# Patient Record
Sex: MALE | Race: WHITE | NOT HISPANIC OR LATINO | Employment: OTHER | URBAN - METROPOLITAN AREA
[De-identification: names, ages, dates, MRNs, and addresses within clinical notes are randomized per-mention and may not be internally consistent; named-entity substitution may affect disease eponyms.]

---

## 2017-04-24 ENCOUNTER — ALLIED HEALTH/NURSE VISIT (OUTPATIENT)
Dept: NURSING | Facility: CLINIC | Age: 27
End: 2017-04-24
Payer: COMMERCIAL

## 2017-04-24 DIAGNOSIS — Z23 NEED FOR TDAP VACCINATION: Primary | ICD-10-CM

## 2017-04-24 PROCEDURE — 99207 ZZC NO CHARGE NURSE ONLY: CPT

## 2017-04-24 PROCEDURE — 90715 TDAP VACCINE 7 YRS/> IM: CPT

## 2017-04-24 PROCEDURE — 90471 IMMUNIZATION ADMIN: CPT

## 2023-11-19 PROBLEM — Z11.59 NEED FOR HEPATITIS C SCREENING TEST: Status: ACTIVE | Noted: 2023-11-19

## 2024-03-19 ENCOUNTER — HOSPITAL ENCOUNTER (EMERGENCY)
Facility: CLINIC | Age: 34
Discharge: HOME | End: 2024-03-19
Attending: PHYSICIAN ASSISTANT | Admitting: PHYSICIAN ASSISTANT

## 2024-03-19 VITALS
BODY MASS INDEX: 24.65 KG/M2 | HEIGHT: 70 IN | WEIGHT: 172.18 LBS | TEMPERATURE: 100.4 F | HEART RATE: 77 BPM | DIASTOLIC BLOOD PRESSURE: 70 MMHG | RESPIRATION RATE: 18 BRPM | SYSTOLIC BLOOD PRESSURE: 116 MMHG | OXYGEN SATURATION: 97 %

## 2024-03-19 DIAGNOSIS — J10.1 INFLUENZA B: ICD-10-CM

## 2024-03-19 LAB
ALBUMIN SERPL BCG-MCNC: 4.3 G/DL (ref 3.5–5.2)
ALBUMIN UR-MCNC: 30 MG/DL
ALP SERPL-CCNC: 63 U/L (ref 40–150)
ALT SERPL W P-5'-P-CCNC: 22 U/L (ref 0–70)
ANION GAP SERPL CALCULATED.3IONS-SCNC: 15 MMOL/L (ref 7–15)
APPEARANCE UR: CLEAR
AST SERPL W P-5'-P-CCNC: 26 U/L (ref 0–45)
ATRIAL RATE - MUSE: 89 BPM
BASOPHILS # BLD AUTO: 0 10E3/UL (ref 0–0.2)
BASOPHILS NFR BLD AUTO: 0 %
BILIRUB SERPL-MCNC: 1.1 MG/DL
BILIRUB UR QL STRIP: NEGATIVE
BUN SERPL-MCNC: 16 MG/DL (ref 6–20)
CALCIUM SERPL-MCNC: 9.1 MG/DL (ref 8.6–10)
CHLORIDE SERPL-SCNC: 98 MMOL/L (ref 98–107)
COLOR UR AUTO: YELLOW
CREAT SERPL-MCNC: 1.14 MG/DL (ref 0.67–1.17)
DEPRECATED HCO3 PLAS-SCNC: 26 MMOL/L (ref 22–29)
DIASTOLIC BLOOD PRESSURE - MUSE: NORMAL MMHG
EGFRCR SERPLBLD CKD-EPI 2021: 86 ML/MIN/1.73M2
EOSINOPHIL # BLD AUTO: 0 10E3/UL (ref 0–0.7)
EOSINOPHIL NFR BLD AUTO: 0 %
ERYTHROCYTE [DISTWIDTH] IN BLOOD BY AUTOMATED COUNT: 12.8 % (ref 10–15)
FLUAV RNA SPEC QL NAA+PROBE: NEGATIVE
FLUBV RNA RESP QL NAA+PROBE: POSITIVE
GLUCOSE SERPL-MCNC: 156 MG/DL (ref 70–99)
GLUCOSE UR STRIP-MCNC: NEGATIVE MG/DL
HCT VFR BLD AUTO: 44.6 % (ref 40–53)
HGB BLD-MCNC: 14.8 G/DL (ref 13.3–17.7)
HGB UR QL STRIP: NEGATIVE
IMM GRANULOCYTES # BLD: 0 10E3/UL
IMM GRANULOCYTES NFR BLD: 0 %
INTERPRETATION ECG - MUSE: NORMAL
KETONES UR STRIP-MCNC: NEGATIVE MG/DL
LEUKOCYTE ESTERASE UR QL STRIP: NEGATIVE
LYMPHOCYTES # BLD AUTO: 1.2 10E3/UL (ref 0.8–5.3)
LYMPHOCYTES NFR BLD AUTO: 22 %
MCH RBC QN AUTO: 29.3 PG (ref 26.5–33)
MCHC RBC AUTO-ENTMCNC: 33.2 G/DL (ref 31.5–36.5)
MCV RBC AUTO: 88 FL (ref 78–100)
MONOCYTES # BLD AUTO: 0.6 10E3/UL (ref 0–1.3)
MONOCYTES NFR BLD AUTO: 11 %
MUCOUS THREADS #/AREA URNS LPF: PRESENT /LPF
NEUTROPHILS # BLD AUTO: 3.6 10E3/UL (ref 1.6–8.3)
NEUTROPHILS NFR BLD AUTO: 67 %
NITRATE UR QL: NEGATIVE
NRBC # BLD AUTO: 0 10E3/UL
NRBC BLD AUTO-RTO: 0 /100
P AXIS - MUSE: 66 DEGREES
PH UR STRIP: 6 [PH] (ref 5–7)
PLATELET # BLD AUTO: 127 10E3/UL (ref 150–450)
POTASSIUM SERPL-SCNC: 4.4 MMOL/L (ref 3.4–5.3)
PR INTERVAL - MUSE: 140 MS
PROT SERPL-MCNC: 7.3 G/DL (ref 6.4–8.3)
QRS DURATION - MUSE: 92 MS
QT - MUSE: 330 MS
QTC - MUSE: 401 MS
R AXIS - MUSE: 72 DEGREES
RBC # BLD AUTO: 5.05 10E6/UL (ref 4.4–5.9)
RBC URINE: 2 /HPF
RSV RNA SPEC NAA+PROBE: NEGATIVE
SARS-COV-2 RNA RESP QL NAA+PROBE: NEGATIVE
SODIUM SERPL-SCNC: 139 MMOL/L (ref 135–145)
SP GR UR STRIP: 1.03 (ref 1–1.03)
SYSTOLIC BLOOD PRESSURE - MUSE: NORMAL MMHG
T AXIS - MUSE: 36 DEGREES
TROPONIN T SERPL HS-MCNC: 7 NG/L
UROBILINOGEN UR STRIP-MCNC: NORMAL MG/DL
VENTRICULAR RATE- MUSE: 89 BPM
WBC # BLD AUTO: 5.5 10E3/UL (ref 4–11)
WBC URINE: 1 /HPF

## 2024-03-19 PROCEDURE — 80053 COMPREHEN METABOLIC PANEL: CPT | Performed by: PHYSICIAN ASSISTANT

## 2024-03-19 PROCEDURE — 93005 ELECTROCARDIOGRAM TRACING: CPT

## 2024-03-19 PROCEDURE — 250N000013 HC RX MED GY IP 250 OP 250 PS 637: Performed by: PHYSICIAN ASSISTANT

## 2024-03-19 PROCEDURE — 99284 EMERGENCY DEPT VISIT MOD MDM: CPT

## 2024-03-19 PROCEDURE — 84484 ASSAY OF TROPONIN QUANT: CPT | Performed by: PHYSICIAN ASSISTANT

## 2024-03-19 PROCEDURE — 81001 URINALYSIS AUTO W/SCOPE: CPT | Performed by: PHYSICIAN ASSISTANT

## 2024-03-19 PROCEDURE — 85004 AUTOMATED DIFF WBC COUNT: CPT | Performed by: PHYSICIAN ASSISTANT

## 2024-03-19 PROCEDURE — 36415 COLL VENOUS BLD VENIPUNCTURE: CPT | Performed by: PHYSICIAN ASSISTANT

## 2024-03-19 PROCEDURE — 87637 SARSCOV2&INF A&B&RSV AMP PRB: CPT | Performed by: PHYSICIAN ASSISTANT

## 2024-03-19 RX ORDER — ACETAMINOPHEN 325 MG/1
975 TABLET ORAL ONCE
Status: COMPLETED | OUTPATIENT
Start: 2024-03-19 | End: 2024-03-19

## 2024-03-19 RX ADMIN — ACETAMINOPHEN 975 MG: 325 TABLET, FILM COATED ORAL at 11:27

## 2024-03-19 ASSESSMENT — COLUMBIA-SUICIDE SEVERITY RATING SCALE - C-SSRS
1. IN THE PAST MONTH, HAVE YOU WISHED YOU WERE DEAD OR WISHED YOU COULD GO TO SLEEP AND NOT WAKE UP?: NO
6. HAVE YOU EVER DONE ANYTHING, STARTED TO DO ANYTHING, OR PREPARED TO DO ANYTHING TO END YOUR LIFE?: NO
2. HAVE YOU ACTUALLY HAD ANY THOUGHTS OF KILLING YOURSELF IN THE PAST MONTH?: NO

## 2024-03-19 ASSESSMENT — ACTIVITIES OF DAILY LIVING (ADL)
ADLS_ACUITY_SCORE: 35

## 2024-03-19 NOTE — ED PROVIDER NOTES
"  History     Chief Complaint:  Dizziness and Fatigue       HPI   Lenin Hassan is a 33 year old male who presents for evaluation of dizziness, fatigue, fever, cough, and rhinorrhea this morning. He has had a Tmax of 102.9 taken at 0300 this morning. He has been around multiple sick contacts. He describes the dizziness as a \"tunnel-vision\" sensation, feeling the need to lay down due to feeling near-syncope. He denies a room-spinning sensation. He also has a decreased appetite and diarrhea. He notes similar symptoms 2 weeks ago which resolved on their own. He denies chest pain, shortness of breath, leg swelling, abdominal pain, ear pain, sore throat, dysuria, nausea, vomiting, headaches, vision changes, or blood in stool. Of note, he mentions baseline urinary frequency, but attributes this to heavy coffee consumption. He denies alcohol use.    Independent Historian:   Mother report she was sick with RSV recently, but she has recovered from her symptoms.    Review of External Notes:   None       Medications:  The patient is currently on no regular medications.    Past Medical History:     The patient denies past medical history.      Past Surgical History:    None    Physical Exam   Patient Vitals for the past 24 hrs:   BP Temp Temp src Pulse Resp SpO2 Height Weight   03/19/24 1330 116/70 -- -- 77 -- 97 % -- --   03/19/24 1319 116/75 -- -- -- -- 96 % -- --   03/19/24 1304 125/84 -- -- -- -- 95 % -- --   03/19/24 1249 114/63 -- -- -- -- 96 % -- --   03/19/24 1234 122/61 -- -- -- -- 96 % -- --   03/19/24 1219 124/63 -- -- -- -- 96 % -- --   03/19/24 1159 126/65 -- -- 83 -- 95 % -- --   03/19/24 1145 113/75 -- -- 85 -- 94 % -- --   03/19/24 1050 134/76 100.4  F (38  C) Oral 100 18 99 % 1.778 m (5' 10\") 78.1 kg (172 lb 2.9 oz)        Physical Exam    Constitutional: Pleasant. Cooperative.   Eyes: Pupils equally round and reactive  HENT: Head is normal in appearance. Oropharynx is normal with moist mucus " membranes.  Cardiovascular: Regular rate and rhythm and without murmurs.  Respiratory: Normal respiratory effort, lungs are clear bilaterally.  GI: Abdomen is soft, non-tender, non-distended. No guarding, rebound, or rigidity.  Musculoskeletal: No asymmetry of the lower extremities.  Skin: Normal, without rash.  Neurologic: Cranial nerves grossly intact, normal cognition, no focal deficits. Alert and oriented x 3.   Psychiatric: Normal affect.  Nursing notes and vital signs reviewed.       Emergency Department Course   ECG  ECG results from 03/19/24   EKG 12-lead, tracing only     Value    Systolic Blood Pressure     Diastolic Blood Pressure     Ventricular Rate 89    Atrial Rate 89    KS Interval 140    QRS Duration 92        QTc 401    P Axis 66    R AXIS 72    T Axis 36    Interpretation ECG      Sinus rhythm  Normal ECG  No previous ECGs available  Unconfirmed report - interpretation of this ECG is computer generated - see medical record for final interpretation  Confirmed by - EMERGENCY ROOM, PHYSICIAN ([id number]),  RIDDHI JONES ([id number]) on 3/19/2024 12:13:15 PM       Laboratory:  Labs Ordered and Resulted from Time of ED Arrival to Time of ED Departure   INFLUENZA A/B, RSV, & SARS-COV2 PCR - Abnormal       Result Value    Influenza A PCR Negative      Influenza B PCR Positive (*)     RSV PCR Negative      SARS CoV2 PCR Negative     COMPREHENSIVE METABOLIC PANEL - Abnormal    Sodium 139      Potassium 4.4      Carbon Dioxide (CO2) 26      Anion Gap 15      Urea Nitrogen 16.0      Creatinine 1.14      GFR Estimate 86      Calcium 9.1      Chloride 98      Glucose 156 (*)     Alkaline Phosphatase 63      AST 26      ALT 22      Protein Total 7.3      Albumin 4.3      Bilirubin Total 1.1     ROUTINE UA WITH MICROSCOPIC REFLEX TO CULTURE - Abnormal    Color Urine Yellow      Appearance Urine Clear      Glucose Urine Negative      Bilirubin Urine Negative      Ketones Urine Negative      Specific Gravity  Urine 1.033      Blood Urine Negative      pH Urine 6.0      Protein Albumin Urine 30 (*)     Urobilinogen Urine Normal      Nitrite Urine Negative      Leukocyte Esterase Urine Negative      Mucus Urine Present (*)     RBC Urine 2      WBC Urine 1     CBC WITH PLATELETS AND DIFFERENTIAL - Abnormal    WBC Count 5.5      RBC Count 5.05      Hemoglobin 14.8      Hematocrit 44.6      MCV 88      MCH 29.3      MCHC 33.2      RDW 12.8      Platelet Count 127 (*)     % Neutrophils 67      % Lymphocytes 22      % Monocytes 11      % Eosinophils 0      % Basophils 0      % Immature Granulocytes 0      NRBCs per 100 WBC 0      Absolute Neutrophils 3.6      Absolute Lymphocytes 1.2      Absolute Monocytes 0.6      Absolute Eosinophils 0.0      Absolute Basophils 0.0      Absolute Immature Granulocytes 0.0      Absolute NRBCs 0.0     TROPONIN T, HIGH SENSITIVITY - Normal    Troponin T, High Sensitivity 7            Emergency Department Course & Assessments:    Interventions:  Medications   acetaminophen (TYLENOL) tablet 975 mg (975 mg Oral $Given 3/19/24 1127)        Assessments:  1113 I obtained history and examined the patient, as noted above.    Independent Interpretation (X-rays, CTs, rhythm strip):  None    Consultations/Discussion of Management or Tests:  None        Social Determinants of Health affecting care:   None    Disposition:  The patient was discharged.     Impression & Plan      MIPS (If applicable):  N/A    Medical Decision Making:  Lenin Hassan is a 33 year old male who presents to the ED for evaluation of fever, cough, lightheadedness, near syncope, amongst other symptoms.  See HPI as above for additional details.  Vitals and physical exam as above.  Differential is broad included influenza, COVID, RSV, bacterial pneumonia, otitis media, intra-abdominal pathology such as appendicitis, amongst others.  Influenza B swab returns positive.  Suspect this is etiology of patient's symptoms.  With respect to  near syncope, ECG reassuring as above.  No chest pain or dyspnea otherwise to suggest for PE, dissection.  No headache to suggest for SAH.  Suspect some degree of orthostatic hypotension based upon patient's associated illness.  Patient not anemic.  No suggestion at this point in time for diabetes/DKA.  Patient outside of window for Tamiflu.  Discussed importance of Tylenol, ibuprofen, hydration.  Will provide patient referral for PCP.  Do feel patient safe for discharge home. Discussed reasons to return. All questions answered. Patient discharged to home in stable condition.      Diagnosis:    ICD-10-CM    1. Influenza B  J10.1 Primary Care Referral           Discharge Medications:  New Prescriptions    No medications on file          Scribe Disclosure:  Lalito KUO, am serving as a scribe at 11:09 AM on 3/19/2024 to document services personally performed by Ritchie John PA-C based on my observations and the provider's statements to me.   3/19/2024   Ritchie John PA-C     This record was created at least in part using electronic voice recognition software, so please excuse any typographical errors.       Ritchie John PA-C  03/19/24 7711

## 2024-03-19 NOTE — ED TRIAGE NOTES
Pt presents with dizziness since this morning. Pt also had dizziness 2 wks ago. Pt reports room spinning, able to stand up from wheelchair in triage without difficulty. Denies pain. Pt also endorses cough. Recent family member with RSV.      Triage Assessment (Adult)       Row Name 03/19/24 1053          Triage Assessment    Airway WDL WDL        Respiratory WDL    Respiratory WDL X;cough        Skin Circulation/Temperature WDL    Skin Circulation/Temperature WDL WDL        Cardiac WDL    Cardiac WDL WDL        Peripheral/Neurovascular WDL    Peripheral Neurovascular WDL WDL        Cognitive/Neuro/Behavioral WDL    Cognitive/Neuro/Behavioral WDL X  dizziness

## 2024-03-19 NOTE — DISCHARGE INSTRUCTIONS
Use Tylenol and ibuprofen for body aches, fevers.  Stay hydrated.  Follow up with a primary doctor to discuss preventative care.